# Patient Record
Sex: FEMALE | Race: WHITE | NOT HISPANIC OR LATINO | URBAN - METROPOLITAN AREA
[De-identification: names, ages, dates, MRNs, and addresses within clinical notes are randomized per-mention and may not be internally consistent; named-entity substitution may affect disease eponyms.]

---

## 2021-01-02 ENCOUNTER — EMERGENCY (EMERGENCY)
Facility: HOSPITAL | Age: 56
LOS: 1 days | Discharge: HOME | End: 2021-01-02
Attending: INTERNAL MEDICINE | Admitting: INTERNAL MEDICINE
Payer: MEDICAID

## 2021-01-02 VITALS
SYSTOLIC BLOOD PRESSURE: 120 MMHG | RESPIRATION RATE: 18 BRPM | OXYGEN SATURATION: 98 % | TEMPERATURE: 98 F | DIASTOLIC BLOOD PRESSURE: 79 MMHG | HEART RATE: 56 BPM

## 2021-01-02 PROCEDURE — 99283 EMERGENCY DEPT VISIT LOW MDM: CPT

## 2021-01-02 RX ORDER — CIPROFLOXACIN AND DEXAMETHASONE 3; 1 MG/ML; MG/ML
4 SUSPENSION/ DROPS AURICULAR (OTIC)
Qty: 7.5 | Refills: 0
Start: 2021-01-02 | End: 2021-01-08

## 2021-01-02 NOTE — ED PROVIDER NOTE - NSFOLLOWUPCLINICS_GEN_ALL_ED_FT
[facility] - ENT  Otolaryngology (ENT)  [address] [place] [address]  Phone: [phone]  Fax:   Follow Up Time: 7-10 Days

## 2021-01-02 NOTE — ED POST DISCHARGE NOTE - RESULT SUMMARY
ISAÍAS Tapia: Pharmacy called stating Ciprodex is not covered by insurance. Verbal RX given for Hydrocortisone/neomycin/polymyxin otic 4 drops in the affected ear, three times a day for one week.

## 2021-01-02 NOTE — ED PROVIDER NOTE - OBJECTIVE STATEMENT
54 y/o F PMH hypothyroid, HLD p/w L ear pain x2 days. Pt notes she has recurrent ear infections ~2x per year and this feels similar, usually treated with antibiotic ear drops or amoxicillin. She notes she picked inside her left ear prior to symptoms beginning. She denies any fevers/chills, HA, vision changes, nasal congestion, throat pain, chest pain, SOB, palpitations, abd pain, n/v/d, urinary sxs, joint pain, LE swelling, or rashes.

## 2021-01-02 NOTE — ED PROVIDER NOTE - ATTENDING CONTRIBUTION TO CARE
Awake, Alert, Conversant.  Resting comfortably.  Normal oropharynx, B/L external auditory canals with mild erythema, no discharge, normal TM B/L, hearing intact B/L.  Breath sounds clear in all lung fields.  RRR.  Abdomen soft and nontender.  No lower extremity swelling or tenderness.  Oriented and conversant with fluent speech, moving all extremities with good strength.    Dr. Lima: I agree with the above provided history and exam and addend/modify it as follows.  55F Hx hypothyroid, HLD P/W L ear pain x2 days following getting water in her ears during shower.  No fever or chills.  Tolerating food and fluids well.  No neck pain or stiffness.    Likely otitis externa.  No evidence of necrotizing infection or mastoiditis.  Plan for analgesia, antibiotics.  Stable to continue to receive care in the outpatient setting.    I Chucho Lima MD performed a history and physical exam of the patient and discussed their management with the resident and /or advanced care provider. I reviewed the resident and /or ACP's note and agree with the documented findings and plan of care. My medical decision making and observations are found above.

## 2021-01-02 NOTE — ED PROVIDER NOTE - NS ED ROS FT
Constitutional: no fever and no chills  Eyes: no discharge, no irritation, no pain, no visual changes  ENMT: (+) L ear pain, no hearing loss, no dysphagia or throat pain  Neck: no pain, no stiffness, no swollen glands  CV: no chest pain, no palpitations, no edema  Resp: no cough, no shortness of breath  Abd: no abdominal pain, no nausea or vomiting, no diarrhea  : no dysuria, no hematuria  MSK: no back pain, no neck pain, no joint pain  Neuro: no LOC, no gait abnormality, no headache, no sensory deficits, no weakness  Skin: no rashes, no lacerations, no lesions

## 2021-01-02 NOTE — ED PROVIDER NOTE - PATIENT PORTAL LINK FT
You can access the FollowMyHealth Patient Portal offered by [facility] by registering at the following website: [email or web address]. By joining [employer]’s FollowMyHealth portal, you will also be able to view your health information using other applications (apps) compatible with our system.

## 2021-01-02 NOTE — ED PROVIDER NOTE - NSFOLLOWUPINSTRUCTIONS_ED_ALL_ED_FT
You were seen in the ER for left ear pain. Based on your exam it is likely you have a mild infection of your ear canal. You will be discharged home with prescription for ciprodex drops (4 drops in each affected ear twice a day for 7 days). We have also provided ENT referral for further evaluation of your recurrent ear infections. Seek immediate medical care or return to the ER for any new or worsening fevers/chills, ear pain, headaches, throat pain, vomiting/inability to eat, abdominal pain, chest pain, difficulty breathing or any other new or concerning symptoms.

## 2021-01-02 NOTE — ED PROVIDER NOTE - PHYSICAL EXAMINATION
PHYSICAL EXAM:  GENERAL: Sitting comfortable in bed, in no acute distress  HENMT: b/l TM unremarkable, mildly erythematous left EAC, moist mucous membranes, no oropharyngeal exudates or vesicles, uvula is midline  EYES: Clear bilaterally, PERRL, EOMs intact b/l  HEART: RRR, S1/S2, no murmur/gallops/rubs  RESPIRATORY: Clear to auscultation bilaterally, no wheezes/rhonchi/rales  ABDOMEN: +BS, soft, nontender, nondistended  EXTREMITIES: No lower extremity edema, +2 radial pulses b/l  NEURO:  A&Ox4, no focal motor deficits or sensory deficits   Heme/LYMPH: No ecchymosis or bruising, no anterior/posterior cervical or supraclavicular LAD  SKIN:  Skin normal color for race, warm, dry and intact. No evidence of rash.

## 2021-01-02 NOTE — ED PROVIDER NOTE - CLINICAL SUMMARY MEDICAL DECISION MAKING FREE TEXT BOX
56 y/o F PMH hypothyroid, HLD p/w L ear pain x2 days. Afebrile, vitals stable. Exam c/w mild otitis externa. Will d/c home with ciprodex gtt and strict return precautions.